# Patient Record
Sex: FEMALE | Race: WHITE
[De-identification: names, ages, dates, MRNs, and addresses within clinical notes are randomized per-mention and may not be internally consistent; named-entity substitution may affect disease eponyms.]

---

## 2020-12-12 ENCOUNTER — TRANSCRIPTION ENCOUNTER (OUTPATIENT)
Age: 70
End: 2020-12-12

## 2020-12-13 ENCOUNTER — TRANSCRIPTION ENCOUNTER (OUTPATIENT)
Age: 70
End: 2020-12-13

## 2021-09-01 ENCOUNTER — TRANSCRIPTION ENCOUNTER (OUTPATIENT)
Age: 71
End: 2021-09-01

## 2021-09-19 ENCOUNTER — TRANSCRIPTION ENCOUNTER (OUTPATIENT)
Age: 71
End: 2021-09-19

## 2021-10-18 PROBLEM — Z00.00 ENCOUNTER FOR PREVENTIVE HEALTH EXAMINATION: Status: ACTIVE | Noted: 2021-10-18

## 2021-10-19 ENCOUNTER — APPOINTMENT (OUTPATIENT)
Dept: MRI IMAGING | Facility: CLINIC | Age: 71
End: 2021-10-19
Payer: MEDICARE

## 2021-10-19 PROCEDURE — 72148 MRI LUMBAR SPINE W/O DYE: CPT | Mod: MH

## 2022-04-29 VITALS — HEIGHT: 63 IN | BODY MASS INDEX: 25.69 KG/M2 | WEIGHT: 145 LBS

## 2022-05-02 ENCOUNTER — APPOINTMENT (OUTPATIENT)
Dept: ORTHOPEDIC SURGERY | Facility: CLINIC | Age: 72
End: 2022-05-02
Payer: MEDICARE

## 2022-05-02 DIAGNOSIS — Z78.9 OTHER SPECIFIED HEALTH STATUS: ICD-10-CM

## 2022-05-02 DIAGNOSIS — M53.3 SACROCOCCYGEAL DISORDERS, NOT ELSEWHERE CLASSIFIED: ICD-10-CM

## 2022-05-02 DIAGNOSIS — M47.817 SPONDYLOSIS W/OUT MYELOPATHY OR RADICULOPATHY, LUMBOSACRAL REGION: ICD-10-CM

## 2022-05-02 DIAGNOSIS — M41.86 OTHER FORMS OF SCOLIOSIS, LUMBAR REGION: ICD-10-CM

## 2022-05-02 DIAGNOSIS — M51.37 OTHER INTERVERTEBRAL DISC DEGENERATION, LUMBOSACRAL REGION: ICD-10-CM

## 2022-05-02 DIAGNOSIS — M48.062 SPINAL STENOSIS, LUMBAR REGION WITH NEUROGENIC CLAUDICATION: ICD-10-CM

## 2022-05-02 DIAGNOSIS — Z85.41 PERSONAL HISTORY OF MALIGNANT NEOPLASM OF CERVIX UTERI: ICD-10-CM

## 2022-05-02 DIAGNOSIS — M54.16 RADICULOPATHY, LUMBAR REGION: ICD-10-CM

## 2022-05-02 DIAGNOSIS — M51.36 OTHER INTERVERTEBRAL DISC DEGENERATION, LUMBAR REGION: ICD-10-CM

## 2022-05-02 PROCEDURE — 99214 OFFICE O/P EST MOD 30 MIN: CPT

## 2022-05-02 NOTE — ASSESSMENT
[FreeTextEntry1] : Recommend: - rest - ice - compression - elevation \par \par Recommend: - NSAID - Heating pad - Muscle relaxer - Core strengthening exercise - Hamstring stretching exercise Patient is given back rehabilitation exercise book. \par \par Referral to pain management for injections, follow up 2 weeks after injection.

## 2022-05-02 NOTE — HISTORY OF PRESENT ILLNESS
[Burning] : burning [Meds] : meds [Sitting] : sitting [Standing] : standing [Walking] : walking [de-identified] : FU Lumbar spine. \par Pt reports having ASHLEE 12/2021 with Dr Frost with improvement in left leg pain \par Reports "burning" pain in lower left side of back \par Pt reports being treated for her left hip with Dr Mackay- needs PARTHA but would like to address back pain first \par Pt reoports having frequent falls. \par Pt had EMG with [facility] neuro  [] : no [FreeTextEntry9] : Tylenol  [de-identified] : 11/2021, 12/2021 [de-identified] : ASHLEE with Dr Frost

## 2022-07-06 ENCOUNTER — APPOINTMENT (OUTPATIENT)
Dept: ORTHOPEDIC SURGERY | Facility: CLINIC | Age: 72
End: 2022-07-06

## 2022-07-06 VITALS — BODY MASS INDEX: 23.9 KG/M2 | WEIGHT: 140 LBS | HEIGHT: 64 IN

## 2022-07-06 PROCEDURE — 99214 OFFICE O/P EST MOD 30 MIN: CPT

## 2022-07-06 NOTE — HISTORY OF PRESENT ILLNESS
[de-identified] : Patient presents for follow up of Left hip pain. Since last visit, she saw Dr. Nelson who advised injection for SI joint pain.  She underwent left SI joint injection by Dr. Frost about 2 weeks ago and reports decreased pain.  She is concerned that she continues to have limited motion in the left hip and groin pain with attempts to put shoes and socks on.  She walks with assistance of a cane.

## 2022-07-06 NOTE — DISCUSSION/SUMMARY
[de-identified] : I reviewed patient's imaging and discussed her condition and treatment options.  Patient has failed nonoperative treatments including PT, home exercise program, and NSAIDs.  Patient is a candidate for total hip arthroplasty via anterior approach.  I discussed details of surgery, risks, benefits, and postoperative protocol.  Patient voiced understanding that the risks include but are not limited to pain, bleeding, dislocation, leg length discrepancy, infection, neurovascular injury, and need for further surgery.  All questions were answered.  Verbal consent was obtained in the office.\par \par

## 2022-07-06 NOTE — PHYSICAL EXAM
[5___] : extension 5[unfilled]/5 [2+] : posterior tibialis pulse: 2+ [Left] : left hip with pelvis [AP] : anteroposterior [Lateral] : lateral [Severe arthritis (Tonnis Grade 3)] : Severe arthritis (Tonnis Grade 3) [] : no ecchymosis [TWNoteComboBox7] : flexion 75 degrees

## 2022-07-07 ENCOUNTER — FORM ENCOUNTER (OUTPATIENT)
Age: 72
End: 2022-07-07

## 2022-07-26 ENCOUNTER — FORM ENCOUNTER (OUTPATIENT)
Age: 72
End: 2022-07-26

## 2022-07-27 ENCOUNTER — FORM ENCOUNTER (OUTPATIENT)
Age: 72
End: 2022-07-27

## 2022-07-31 ENCOUNTER — FORM ENCOUNTER (OUTPATIENT)
Age: 72
End: 2022-07-31

## 2022-08-01 ENCOUNTER — APPOINTMENT (OUTPATIENT)
Dept: ORTHOPEDIC SURGERY | Facility: AMBULATORY MEDICAL SERVICES | Age: 72
End: 2022-08-01

## 2022-08-04 ENCOUNTER — APPOINTMENT (OUTPATIENT)
Dept: ORTHOPEDIC SURGERY | Facility: AMBULATORY MEDICAL SERVICES | Age: 72
End: 2022-08-04

## 2022-08-04 PROCEDURE — 27130 TOTAL HIP ARTHROPLASTY: CPT | Mod: 80,LT

## 2022-08-04 PROCEDURE — 27130 TOTAL HIP ARTHROPLASTY: CPT | Mod: LT

## 2022-08-09 ENCOUNTER — APPOINTMENT (OUTPATIENT)
Dept: ORTHOPEDIC SURGERY | Facility: CLINIC | Age: 72
End: 2022-08-09

## 2022-08-09 PROCEDURE — 99024 POSTOP FOLLOW-UP VISIT: CPT

## 2022-08-09 NOTE — DISCUSSION/SUMMARY
[de-identified] : I discussed her condition and treatment course with patient and her .  I advised obtaining ultrasound to evaluate for thrombus.  Patient and her  voiced understanding and agreement with the plan.\par

## 2022-08-09 NOTE — HISTORY OF PRESENT ILLNESS
[de-identified] : Patient is s/p LT PARTHA via anterior approach 8/4/2022. She is concerned with swelling in the leg and describes pain in the calf. She has been taking Aspirin, ambulating with a walker and taking Tylenol for the pain. She is scheduled for her first in home PT session this week.

## 2022-08-09 NOTE — PHYSICAL EXAM
[Left] : left hip [FreeTextEntry3] : surgical dressing clean and dry [Moderate] : moderate diffused ankle swelling [] : calf tenderness

## 2022-08-17 ENCOUNTER — APPOINTMENT (OUTPATIENT)
Dept: ORTHOPEDIC SURGERY | Facility: CLINIC | Age: 72
End: 2022-08-17

## 2022-08-17 PROCEDURE — 73502 X-RAY EXAM HIP UNI 2-3 VIEWS: CPT

## 2022-08-17 PROCEDURE — 99024 POSTOP FOLLOW-UP VISIT: CPT

## 2022-08-17 NOTE — HISTORY OF PRESENT ILLNESS
[de-identified] : Patient presents today for follow up left hip s/p surgery.  Patient reports continued swelling in the leg, but improved since last visit.  She is ambulating using a walker. She is currently doing at home PT 3x a week.  Takes aspirin for DVT prevention.

## 2022-08-17 NOTE — PHYSICAL EXAM
[Moderate] : moderate diffused ankle swelling [Left] : left hip [AP] : anteroposterior [Lateral] : lateral [FreeTextEntry3] : surgical dressing clean and dry [FreeTextEntry9] : components in appropriate positioning [] : uses walker

## 2022-08-17 NOTE — DISCUSSION/SUMMARY
[de-identified] : I reviewed patient's radiographs and discussed her condition and treatment course.  Continue aspirin for DVT prevention.  Continue PT and HEP.  Follow up with PCP Dr. Mcclellan given bilateral lower extremity swelling.  Patient voiced understanding and agreement with the plan.\par

## 2022-08-18 ENCOUNTER — FORM ENCOUNTER (OUTPATIENT)
Age: 72
End: 2022-08-18

## 2022-08-21 ENCOUNTER — FORM ENCOUNTER (OUTPATIENT)
Age: 72
End: 2022-08-21

## 2022-08-22 ENCOUNTER — FORM ENCOUNTER (OUTPATIENT)
Age: 72
End: 2022-08-22

## 2022-08-31 ENCOUNTER — FORM ENCOUNTER (OUTPATIENT)
Age: 72
End: 2022-08-31

## 2022-09-01 ENCOUNTER — FORM ENCOUNTER (OUTPATIENT)
Age: 72
End: 2022-09-01

## 2022-09-14 ENCOUNTER — APPOINTMENT (OUTPATIENT)
Dept: ORTHOPEDIC SURGERY | Facility: CLINIC | Age: 72
End: 2022-09-14

## 2022-09-14 PROCEDURE — 99024 POSTOP FOLLOW-UP VISIT: CPT

## 2022-09-14 NOTE — PHYSICAL EXAM
[Left] : left hip [AP] : anteroposterior [Lateral] : lateral [Moderate] : moderate diffused ankle swelling [FreeTextEntry9] : components in appropriate positioning [5___] : flexion 5[unfilled]/5 [2+] : posterior tibialis pulse: 2+ [] : ambulation with cane

## 2022-09-14 NOTE — HISTORY OF PRESENT ILLNESS
[de-identified] : Patient presents today for follow up left hip. Patient is S/p PARTHA 8/4/22. Patient reports minimal pain since last visit. She states she does have some irritation at her incision site. She is ambulating using a cane. Patient continues to go to physical therapy 2x a week which has helped increase strength. She reports decreased leg swelling.  Admits to some low back pain.

## 2022-09-14 NOTE — DISCUSSION/SUMMARY
[de-identified] : I discussed her condition and treatment course.  Follow up in 6 weeks with XRs.  Follow up with Dr. Frost for back pain.  Patient voiced understanding and agreement with the plan.\par

## 2022-11-11 ENCOUNTER — APPOINTMENT (OUTPATIENT)
Dept: ORTHOPEDIC SURGERY | Facility: CLINIC | Age: 72
End: 2022-11-11

## 2022-11-11 DIAGNOSIS — M16.12 UNILATERAL PRIMARY OSTEOARTHRITIS, LEFT HIP: ICD-10-CM

## 2022-11-11 PROCEDURE — 73502 X-RAY EXAM HIP UNI 2-3 VIEWS: CPT | Mod: FY

## 2022-11-11 PROCEDURE — 99213 OFFICE O/P EST LOW 20 MIN: CPT

## 2022-11-11 NOTE — PHYSICAL EXAM
[5___] : flexion 5[unfilled]/5 [2+] : posterior tibialis pulse: 2+ [] : patient ambulates without assistive device [Left] : left hip with pelvis [AP] : anteroposterior [Lateral] : lateral [Components well fixed, in good position] : Components well fixed, in good position

## 2022-11-11 NOTE — DISCUSSION/SUMMARY
[de-identified] : I reviewed patient's radiographs and discussed her condition and treatment options.  Continue PT on gait training.  Follow up with Dr. Rayo in 1-2 months if symptoms in right knee persist.  Patient voiced understanding and agreement with the plan.\par

## 2022-11-11 NOTE — HISTORY OF PRESENT ILLNESS
[de-identified] : Since last visit, states she is having a great recovery and PT is going great with her hip. She walks without assistive devices.  She is concerned that her right knee seems to be sticking out and occasionally has knee pain when the weather is too cold.

## 2023-01-04 ENCOUNTER — APPOINTMENT (OUTPATIENT)
Dept: ORTHOPEDIC SURGERY | Facility: CLINIC | Age: 73
End: 2023-01-04
Payer: MEDICARE

## 2023-01-04 DIAGNOSIS — Z78.9 OTHER SPECIFIED HEALTH STATUS: ICD-10-CM

## 2023-01-04 PROCEDURE — 73562 X-RAY EXAM OF KNEE 3: CPT | Mod: RT

## 2023-01-04 PROCEDURE — 99214 OFFICE O/P EST MOD 30 MIN: CPT

## 2023-01-04 PROCEDURE — L1812: CPT | Mod: RT,KX

## 2023-01-04 NOTE — PHYSICAL EXAM
[4___] : hamstring 4[unfilled]/5 [Right] : right knee [Lateral] : lateral [[place]] : skyline [AP Standing] : anteroposterior standing [advanced tricompartmental OA with medial compartment narrowing and varus alignment] : advanced tricompartmental OA with medial compartment narrowing and varus alignment [Advanced patellofemoral OA] : Advanced patellofemoral OA [] : no extensor lag [TWNoteComboBox7] : flexion 115 degrees [de-identified] : extension 0 degrees

## 2023-01-04 NOTE — HISTORY OF PRESENT ILLNESS
[de-identified] : Patient denies any pain. She has instability in the knee with walking and would like to try some PT following her PARTHA with Dr Mackay.

## 2023-02-15 ENCOUNTER — APPOINTMENT (OUTPATIENT)
Dept: ORTHOPEDIC SURGERY | Facility: CLINIC | Age: 73
End: 2023-02-15
Payer: MEDICARE

## 2023-02-15 DIAGNOSIS — M17.11 UNILATERAL PRIMARY OSTEOARTHRITIS, RIGHT KNEE: ICD-10-CM

## 2023-02-15 PROCEDURE — 99213 OFFICE O/P EST LOW 20 MIN: CPT

## 2023-02-15 NOTE — PHYSICAL EXAM
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

## 2025-03-05 ENCOUNTER — OFFICE (OUTPATIENT)
Dept: URBAN - METROPOLITAN AREA CLINIC 8 | Facility: CLINIC | Age: 75
Setting detail: OPHTHALMOLOGY
End: 2025-03-05
Payer: MEDICARE

## 2025-03-05 DIAGNOSIS — H25.13: ICD-10-CM

## 2025-03-05 PROCEDURE — 99204 OFFICE O/P NEW MOD 45 MIN: CPT | Performed by: OPHTHALMOLOGY

## 2025-03-05 ASSESSMENT — KERATOMETRY
OD_K1POWER_DIOPTERS: 43.25
OS_K2POWER_DIOPTERS: 43.25
OD_K2POWER_DIOPTERS: 43.25
OS_K1POWER_DIOPTERS: 43.00
OS_AXISANGLE_DEGREES: 163
OD_AXISANGLE_DEGREES: 090

## 2025-03-05 ASSESSMENT — REFRACTION_CURRENTRX
OS_ADD: +2.25
OD_OVR_VA: 20/
OS_AXIS: 096
OD_CYLINDER: -0.75
OS_CYLINDER: -0.25
OS_VPRISM_DIRECTION: PROGS
OD_VPRISM_DIRECTION: PROGS
OS_SPHERE: +3.75
OD_ADD: +2.25
OD_AXIS: 097
OD_SPHERE: +3.75
OS_OVR_VA: 20/

## 2025-03-05 ASSESSMENT — REFRACTION_AUTOREFRACTION
OD_AXIS: 089
OD_CYLINDER: -0.75
OD_SPHERE: +4.00
OS_AXIS: 078
OS_CYLINDER: -0.50
OS_SPHERE: +4.00

## 2025-03-05 ASSESSMENT — REFRACTION_MANIFEST
OD_CYLINDER: -0.75
OS_CYLINDER: -0.25
OD_ADD: +2.50
OS_AXIS: 080
OD_AXIS: 090
OD_VA1: 20/25-2
OD_SPHERE: +3.75
OS_ADD: +2.50
OS_VA1: 20/25-2
OS_SPHERE: +3.75

## 2025-03-05 ASSESSMENT — CONFRONTATIONAL VISUAL FIELD TEST (CVF)
OD_FINDINGS: FULL
OS_FINDINGS: FULL

## 2025-03-05 ASSESSMENT — VISUAL ACUITY
OS_BCVA: 20/50-
OD_BCVA: 20/40-2

## 2025-03-31 ENCOUNTER — OFFICE (OUTPATIENT)
Dept: URBAN - METROPOLITAN AREA CLINIC 8 | Facility: CLINIC | Age: 75
Setting detail: OPHTHALMOLOGY
End: 2025-03-31
Payer: MEDICARE

## 2025-03-31 DIAGNOSIS — H25.13: ICD-10-CM

## 2025-03-31 DIAGNOSIS — H25.11: ICD-10-CM

## 2025-03-31 PROCEDURE — 92136 OPHTHALMIC BIOMETRY: CPT | Mod: 26,RT | Performed by: OPHTHALMOLOGY

## 2025-03-31 PROCEDURE — 92136 OPHTHALMIC BIOMETRY: CPT | Mod: TC | Performed by: OPHTHALMOLOGY

## 2025-03-31 PROCEDURE — 99213 OFFICE O/P EST LOW 20 MIN: CPT | Performed by: OPHTHALMOLOGY

## 2025-03-31 ASSESSMENT — REFRACTION_MANIFEST
OS_AXIS: 080
OD_CYLINDER: -0.75
OD_SPHERE: +3.75
OD_ADD: +2.50
OS_VA1: 20/25-2
OS_CYLINDER: -0.25
OD_AXIS: 090
OS_SPHERE: +3.75
OD_VA1: 20/25-2
OS_ADD: +2.50

## 2025-03-31 ASSESSMENT — REFRACTION_CURRENTRX
OS_OVR_VA: 20/
OD_ADD: +2.25
OD_CYLINDER: -0.75
OS_SPHERE: +3.75
OS_CYLINDER: -0.25
OD_AXIS: 097
OS_AXIS: 096
OS_VPRISM_DIRECTION: PROGS
OS_ADD: +2.25
OD_SPHERE: +3.75
OD_OVR_VA: 20/
OD_VPRISM_DIRECTION: PROGS

## 2025-03-31 ASSESSMENT — CONFRONTATIONAL VISUAL FIELD TEST (CVF)
OD_FINDINGS: FULL
OS_FINDINGS: FULL

## 2025-03-31 ASSESSMENT — VISUAL ACUITY
OS_BCVA: 20/50-
OD_BCVA: 20/40-2

## 2025-04-15 ENCOUNTER — OUTPATIENT HOSPITAL (OUTPATIENT)
Dept: URBAN - METROPOLITAN AREA CLINIC 7 | Facility: CLINIC | Age: 75
Setting detail: OPHTHALMOLOGY
End: 2025-04-15
Payer: MEDICARE

## 2025-04-15 DIAGNOSIS — H52.221: ICD-10-CM

## 2025-04-15 DIAGNOSIS — H25.11: ICD-10-CM

## 2025-04-15 PROCEDURE — FEMTO PRECISION LASER CATARACT SURGERY: Mod: GY | Performed by: OPHTHALMOLOGY

## 2025-04-15 PROCEDURE — S9986 NOT MEDICALLY NECESSARY SVC: HCPCS | Mod: GX,GY | Performed by: OPHTHALMOLOGY

## 2025-04-15 PROCEDURE — 66984 XCAPSL CTRC RMVL W/O ECP: CPT | Mod: RT | Performed by: OPHTHALMOLOGY

## 2025-04-16 ENCOUNTER — RX ONLY (RX ONLY)
Age: 75
End: 2025-04-16

## 2025-04-16 ENCOUNTER — OFFICE (OUTPATIENT)
Dept: URBAN - METROPOLITAN AREA CLINIC 8 | Facility: CLINIC | Age: 75
Setting detail: OPHTHALMOLOGY
End: 2025-04-16
Payer: MEDICARE

## 2025-04-16 DIAGNOSIS — H25.12: ICD-10-CM

## 2025-04-16 PROBLEM — Z96.1 PSEUDOPHAKIA: POST OP DAY 1: Status: ACTIVE | Noted: 2025-04-16

## 2025-04-16 PROCEDURE — 92136 OPHTHALMIC BIOMETRY: CPT | Mod: 26,LT | Performed by: OPHTHALMOLOGY

## 2025-04-16 ASSESSMENT — KERATOMETRY
METHOD_AUTO_MANUAL: AUTO
OS_K2POWER_DIOPTERS: 43.25
OD_K2POWER_DIOPTERS: 43.00
OS_K1POWER_DIOPTERS: 43.00
OS_AXISANGLE_DEGREES: 167
OD_AXISANGLE_DEGREES: 090
OD_K1POWER_DIOPTERS: 43.00

## 2025-04-16 ASSESSMENT — REFRACTION_AUTOREFRACTION
OD_CYLINDER: -0.75
OD_AXIS: 090
OS_AXIS: 078
OD_SPHERE: +0.75
OS_SPHERE: +4.00
OS_CYLINDER: -0.50

## 2025-04-16 ASSESSMENT — REFRACTION_MANIFEST
OD_ADD: +2.50
OD_AXIS: 090
OS_ADD: +2.50
OS_VA1: 20/25-2
OS_CYLINDER: -0.25
OD_VA1: 20/25-2
OS_AXIS: 080
OD_CYLINDER: -0.75
OD_SPHERE: +3.75
OS_SPHERE: +3.75

## 2025-04-16 ASSESSMENT — VISUAL ACUITY
OD_BCVA: 20/40-2
OS_BCVA: 20/25-2

## 2025-04-16 ASSESSMENT — REFRACTION_CURRENTRX
OD_VPRISM_DIRECTION: PROGS
OD_OVR_VA: 20/
OS_VPRISM_DIRECTION: PROGS
OS_SPHERE: +3.75
OD_ADD: +2.25
OS_AXIS: 096
OS_OVR_VA: 20/
OS_CYLINDER: -0.25
OS_ADD: +2.25
OD_AXIS: 097
OD_SPHERE: +3.75
OD_CYLINDER: -0.75

## 2025-04-16 ASSESSMENT — CONFRONTATIONAL VISUAL FIELD TEST (CVF)
OD_FINDINGS: FULL
OS_FINDINGS: FULL

## 2025-04-16 ASSESSMENT — CORNEAL EDEMA CLINICAL DESCRIPTION: OD_CORNEALEDEMA: T

## 2025-05-06 ENCOUNTER — OUTPATIENT HOSPITAL (OUTPATIENT)
Dept: URBAN - METROPOLITAN AREA CLINIC 7 | Facility: CLINIC | Age: 75
Setting detail: OPHTHALMOLOGY
End: 2025-05-06
Payer: MEDICARE

## 2025-05-06 DIAGNOSIS — H25.12: ICD-10-CM

## 2025-05-06 DIAGNOSIS — H52.222: ICD-10-CM

## 2025-05-06 PROCEDURE — 66984 XCAPSL CTRC RMVL W/O ECP: CPT | Mod: 79,LT | Performed by: OPHTHALMOLOGY

## 2025-05-06 PROCEDURE — FEMTO PRECISION LASER CATARACT SURGERY: Mod: GY | Performed by: OPHTHALMOLOGY

## 2025-05-06 PROCEDURE — S9986 NOT MEDICALLY NECESSARY SVC: HCPCS | Mod: GX,GY | Performed by: OPHTHALMOLOGY

## 2025-05-07 ENCOUNTER — OFFICE (OUTPATIENT)
Dept: URBAN - METROPOLITAN AREA CLINIC 8 | Facility: CLINIC | Age: 75
Setting detail: OPHTHALMOLOGY
End: 2025-05-07
Payer: MEDICARE

## 2025-05-07 DIAGNOSIS — Z96.1: ICD-10-CM

## 2025-05-07 PROCEDURE — 99024 POSTOP FOLLOW-UP VISIT: CPT | Performed by: OPHTHALMOLOGY

## 2025-05-07 ASSESSMENT — REFRACTION_MANIFEST
OS_CYLINDER: -0.25
OD_ADD: +2.50
OS_AXIS: 080
OD_CYLINDER: -0.75
OD_SPHERE: +3.75
OD_AXIS: 090
OD_VA1: 20/25-2
OS_VA1: 20/25-2
OS_ADD: +2.50
OS_SPHERE: +3.75

## 2025-05-07 ASSESSMENT — REFRACTION_CURRENTRX
OS_ADD: +2.25
OS_AXIS: 096
OD_CYLINDER: -0.75
OD_AXIS: 097
OS_SPHERE: +3.75
OS_OVR_VA: 20/
OS_CYLINDER: -0.25
OS_VPRISM_DIRECTION: PROGS
OD_SPHERE: +3.75
OD_VPRISM_DIRECTION: PROGS
OD_ADD: +2.25
OD_OVR_VA: 20/

## 2025-05-07 ASSESSMENT — KERATOMETRY
OD_K1POWER_DIOPTERS: 43.00
METHOD_AUTO_MANUAL: AUTO
OS_K2POWER_DIOPTERS: 43.00
OS_AXISANGLE_DEGREES: 090
OS_K1POWER_DIOPTERS: 43.00
OD_K2POWER_DIOPTERS: 43.25
OD_AXISANGLE_DEGREES: 168

## 2025-05-07 ASSESSMENT — VISUAL ACUITY
OS_BCVA: 20/25-
OD_BCVA: 20/30-

## 2025-05-07 ASSESSMENT — REFRACTION_AUTOREFRACTION
OS_CYLINDER: -0.50
OD_AXIS: 081
OS_AXIS: 089
OD_SPHERE: +0.50
OD_CYLINDER: -0.75
OS_SPHERE: +0.25

## 2025-05-07 ASSESSMENT — CONFRONTATIONAL VISUAL FIELD TEST (CVF)
OD_FINDINGS: FULL
OS_FINDINGS: FULL

## 2025-05-07 ASSESSMENT — CORNEAL EDEMA CLINICAL DESCRIPTION: OS_CORNEALEDEMA: T

## 2025-06-30 ENCOUNTER — OFFICE (OUTPATIENT)
Dept: URBAN - METROPOLITAN AREA CLINIC 8 | Facility: CLINIC | Age: 75
Setting detail: OPHTHALMOLOGY
End: 2025-06-30
Payer: MEDICARE

## 2025-06-30 DIAGNOSIS — Z96.1: ICD-10-CM

## 2025-06-30 PROCEDURE — 99024 POSTOP FOLLOW-UP VISIT: CPT | Performed by: OPHTHALMOLOGY

## 2025-06-30 ASSESSMENT — REFRACTION_CURRENTRX
OD_AXIS: 097
OD_ADD: +2.25
OS_AXIS: 096
OD_SPHERE: +3.75
OD_OVR_VA: 20/
OS_VPRISM_DIRECTION: PROGS
OD_VPRISM_DIRECTION: PROGS
OS_SPHERE: +3.75
OS_ADD: +2.25
OS_CYLINDER: -0.25
OD_CYLINDER: -0.75
OS_OVR_VA: 20/

## 2025-06-30 ASSESSMENT — REFRACTION_MANIFEST
OS_CYLINDER: -0.25
OD_VA1: 20/25-2
OS_AXIS: 080
OS_VA1: 20/25-2
OD_AXIS: 090
OD_CYLINDER: -0.75
OD_SPHERE: +3.75
OS_ADD: +2.50
OD_ADD: +2.50
OS_SPHERE: +3.75

## 2025-06-30 ASSESSMENT — VISUAL ACUITY
OD_BCVA: 20/30
OS_BCVA: 20/25-

## 2025-06-30 ASSESSMENT — CONFRONTATIONAL VISUAL FIELD TEST (CVF)
OS_FINDINGS: FULL
OD_FINDINGS: FULL